# Patient Record
Sex: MALE
[De-identification: names, ages, dates, MRNs, and addresses within clinical notes are randomized per-mention and may not be internally consistent; named-entity substitution may affect disease eponyms.]

---

## 2018-10-23 ENCOUNTER — RX ONLY (OUTPATIENT)
Age: 33
Setting detail: RX ONLY
End: 2018-10-23

## 2018-10-23 RX ORDER — SECUKINUMAB 150 MG/ML
INJECTION SUBCUTANEOUS
Qty: 1 | Refills: 0 | Status: ERX | COMMUNITY
Start: 2018-10-23

## 2018-11-21 ENCOUNTER — RX ONLY (OUTPATIENT)
Age: 33
Setting detail: RX ONLY
End: 2018-11-21

## 2018-11-21 RX ORDER — SECUKINUMAB 150 MG/ML
INJECTION SUBCUTANEOUS
Qty: 1 | Refills: 0 | Status: ERX

## 2018-11-27 ENCOUNTER — APPOINTMENT (RX ONLY)
Dept: URBAN - METROPOLITAN AREA CLINIC 151 | Facility: CLINIC | Age: 33
Setting detail: DERMATOLOGY
End: 2018-11-27

## 2018-11-27 DIAGNOSIS — L40.0 PSORIASIS VULGARIS: ICD-10-CM

## 2018-11-27 PROBLEM — M12.9 ARTHROPATHY, UNSPECIFIED: Status: ACTIVE | Noted: 2018-11-27

## 2018-11-27 PROCEDURE — ? DIAGNOSIS COMMENT

## 2018-11-27 PROCEDURE — 99214 OFFICE O/P EST MOD 30 MIN: CPT

## 2018-11-27 PROCEDURE — ? PASI CALCULATION

## 2018-11-27 PROCEDURE — ? ORDER TESTS

## 2018-11-27 PROCEDURE — ? PRESCRIPTION MEDICATION MANAGEMENT

## 2018-11-27 ASSESSMENT — PGA PSORIASIS: PGA PSORIASIS: MINIMAL (MINIMAL PLAQUE ELEVATION, FAINT ERYTHEMA, OCCASIONAL FINE SCALE)

## 2018-11-27 NOTE — PROCEDURE: PASI CALCULATION
Thickness (Head): 1
Erythema (Trunk To Groin): 0
Detail Level: Simple
Include Bsa Calculation In Note: Yes
Erythema (Head): 2
Include Pasi Calculation From Each Zone In Note: No

## 2018-11-27 NOTE — HPI: RASH (PSORIASIS)
How Severe Is Your Psoriasis?: moderate
Do You Have A Family History Of Psoriasis?: no
Is This A New Presentation, Or A Follow-Up?: Follow Up Psoriasis
Additional History: Pt reports that his joint pains are worsening in his fingers and he has morning stiffness for 5 minutes. He stopped MTX since imaging did not show any inflammation.    Per pt, last [facility] was in 8/2018.

## 2018-11-27 NOTE — PROCEDURE: ORDER TESTS
Expected Date Of Service: 11/27/2018
Bill For Surgical Tray: no
Performing Laboratory: [id number]
Lab Facility: [id number]
Billing Type: Third-Party Bill

## 2018-11-27 NOTE — PROCEDURE: DIAGNOSIS COMMENT
Comment: ESPRIT visit today. Discussed switching to another IL 17 inhibitor such as Taltz if his joints are worsening. I advised pt to follow up with his rheumatologist (Dr. Maribel Rojas) for his psoriatic arthritis.  We discussed Taltz (ixekizumab) as an alternative.  I defer to Dr. Rojas as his skin appears to be doing quite well on Cosentyx (secukinumab).
Detail Level: Generalized

## 2018-11-27 NOTE — PROCEDURE: PRESCRIPTION MEDICATION MANAGEMENT
Detail Level: Zone
Continue Regimen: Cosentyx 150mg SQ QOW, Taclonex 0.005% ointment QD PRN, and Protopic 0.1% ointment QD PRN

## 2018-11-30 ENCOUNTER — RX ONLY (OUTPATIENT)
Age: 33
Setting detail: RX ONLY
End: 2018-11-30

## 2018-11-30 RX ORDER — SECUKINUMAB 150 MG/ML
INJECTION SUBCUTANEOUS
Qty: 2 | Refills: 6 | Status: ERX

## 2019-10-02 ENCOUNTER — APPOINTMENT (RX ONLY)
Dept: URBAN - METROPOLITAN AREA CLINIC 151 | Facility: CLINIC | Age: 34
Setting detail: DERMATOLOGY
End: 2019-10-02

## 2019-10-02 DIAGNOSIS — L40.0 PSORIASIS VULGARIS: ICD-10-CM | Status: WELL CONTROLLED

## 2019-10-02 PROCEDURE — ? TALTZ MONITORING

## 2019-10-02 PROCEDURE — ? ORDER TESTS

## 2019-10-02 PROCEDURE — ? PASI CALCULATION

## 2019-10-02 PROCEDURE — ? PRESCRIPTION MEDICATION MANAGEMENT

## 2019-10-02 PROCEDURE — ? PRESCRIPTION

## 2019-10-02 PROCEDURE — ? DIAGNOSIS COMMENT

## 2019-10-02 PROCEDURE — 99213 OFFICE O/P EST LOW 20 MIN: CPT

## 2019-10-02 RX ORDER — IXEKIZUMAB 80 MG/ML
INJECTION, SOLUTION SUBCUTANEOUS
Qty: 1 | Refills: 3 | Status: ERX | COMMUNITY
Start: 2019-10-02

## 2019-10-02 RX ADMIN — IXEKIZUMAB: 80 INJECTION, SOLUTION SUBCUTANEOUS at 15:30

## 2019-10-02 NOTE — PROCEDURE: ORDER TESTS
Lab Facility: [id number]
Billing Type: Third-Party Bill
Expected Date Of Service: 10/02/2019
Bill For Surgical Tray: no
Performing Laboratory: [id number]

## 2019-10-02 NOTE — PROCEDURE: TALTZ MONITORING
Add High Risk Medication Management Associated Diagnosis?: Yes
Length Of Therapy: 5 months
Detail Level: Zone

## 2019-10-02 NOTE — PROCEDURE: PASI CALCULATION
Detail Level: Simple
Include Data From Each Zone In Note: No
Scale (Trunk To Groin): 0
Include Bsa Calculation In Note: Yes

## 2019-10-02 NOTE — PROCEDURE: MIPS QUALITY
Quality 431: Preventive Care And Screening: Unhealthy Alcohol Use - Screening: Patient screened for unhealthy alcohol use using a single question and scores less than 2 times per year
Detail Level: Detailed
Quality 226: Preventive Care And Screening: Tobacco Use: Screening And Cessation Intervention: Patient screened for tobacco use and is an ex/non-smoker
Quality 110: Preventive Care And Screening: Influenza Immunization: Influenza Immunization previously received during influenza season
Quality 130: Documentation Of Current Medications In The Medical Record: Current Medications Documented
Quality 131: Pain Assessment And Follow-Up: Pain assessment using a standardized tool is documented as negative, no follow-up plan required

## 2019-10-02 NOTE — PROCEDURE: DIAGNOSIS COMMENT
Comment: PSORIASIS AND PSA DOING VERY WELL ON TALTZ.  ROBERTRIT visit today. Pt will continue Taltz Q4 weeks.
Detail Level: Generalized

## 2019-10-02 NOTE — HPI: MEDICATION (TALTZ)
How Severe Is It?: moderate
Is This A New Presentation, Or A Follow-Up?: Follow Up Taltz
What Dose Of Taltz Are You Taking?: 80mg SC every 4 weeks
Additional History: Pt notes joint (fingers) pain has decreased since starting Taltz. Notes minimal flare ups around time periods of stress.

## 2020-09-10 ENCOUNTER — APPOINTMENT (RX ONLY)
Dept: URBAN - METROPOLITAN AREA CLINIC 151 | Facility: CLINIC | Age: 35
Setting detail: DERMATOLOGY
End: 2020-09-10

## 2020-09-10 DIAGNOSIS — L40.0 PSORIASIS VULGARIS: ICD-10-CM

## 2020-09-10 PROCEDURE — ? PASI CALCULATION

## 2020-09-10 PROCEDURE — ? DIAGNOSIS COMMENT

## 2020-09-10 PROCEDURE — ? PRESCRIPTION MEDICATION MANAGEMENT

## 2020-09-10 PROCEDURE — ? TALTZ MONITORING

## 2020-09-10 PROCEDURE — 99214 OFFICE O/P EST MOD 30 MIN: CPT

## 2020-09-10 NOTE — PROCEDURE: TALTZ MONITORING
Add High Risk Medication Management Associated Diagnosis?: Yes
Length Of Therapy: 1.5 years
Detail Level: Zone

## 2020-09-10 NOTE — HPI: OTHER
Condition:: Psoriasis
Please Describe Your Condition:: is being seen for a chief complaint of Psoriasis . Taltz 80 mg SC [id number] \\nFlares on the face and palms , secondary to stress \\nStarted Duobri, helpful with the hands . D/C after hands were cleared\\nUses Protopic PRN\\nFinger joints stable , denies pain or stiffness

## 2022-09-15 NOTE — PROCEDURE: MIPS QUALITY
LVM #2 patient to return call     Patient will need to schedule a visit to establish care with a new PCP. Patient will be due for a visit around 3/29/23, please schedule around then.   
Quality 110: Preventive Care And Screening: Influenza Immunization: Influenza Immunization previously received during influenza season
Detail Level: Detailed